# Patient Record
Sex: MALE | Race: WHITE | NOT HISPANIC OR LATINO | URBAN - METROPOLITAN AREA
[De-identification: names, ages, dates, MRNs, and addresses within clinical notes are randomized per-mention and may not be internally consistent; named-entity substitution may affect disease eponyms.]

---

## 2017-02-14 ENCOUNTER — IMPORTED ENCOUNTER (OUTPATIENT)
Dept: URBAN - METROPOLITAN AREA CLINIC 50 | Facility: CLINIC | Age: 82
End: 2017-02-14

## 2017-03-02 NOTE — PROCEDURE NOTE: CLINICAL
Prior to laser, risks/benefits/alternatives to laser discussed including loss of vision, decreased peripheral and night vision, need for more laser and/or surgery and patient wished to proceed. Duration: 0. 1(MSEC) Pulse power: 100/480 mW. Number of pulses: 70. Patient tolerated procedure well. There were no complications. Post-op instructions given.

## 2017-03-07 ENCOUNTER — IMPORTED ENCOUNTER (OUTPATIENT)
Dept: URBAN - METROPOLITAN AREA CLINIC 50 | Facility: CLINIC | Age: 82
End: 2017-03-07

## 2018-07-17 ENCOUNTER — IMPORTED ENCOUNTER (OUTPATIENT)
Dept: URBAN - METROPOLITAN AREA CLINIC 50 | Facility: CLINIC | Age: 83
End: 2018-07-17

## 2019-07-09 ENCOUNTER — IMPORTED ENCOUNTER (OUTPATIENT)
Dept: URBAN - METROPOLITAN AREA CLINIC 50 | Facility: CLINIC | Age: 84
End: 2019-07-09

## 2020-09-29 ENCOUNTER — IMPORTED ENCOUNTER (OUTPATIENT)
Dept: URBAN - METROPOLITAN AREA CLINIC 50 | Facility: CLINIC | Age: 85
End: 2020-09-29

## 2021-04-17 ASSESSMENT — TONOMETRY
OD_IOP_MMHG: 12
OD_IOP_MMHG: 14
OD_IOP_MMHG: 14
OD_IOP_MMHG: 15
OS_IOP_MMHG: 15
OS_IOP_MMHG: 15
OS_IOP_MMHG: 12
OD_IOP_MMHG: 16
OS_IOP_MMHG: 15

## 2021-04-17 ASSESSMENT — VISUAL ACUITY
OS_CC: J1+
OS_SC: 20/30
OD_SC: 20/60
OD_SC: 20/30
OD_CC: 20/25
OD_PH: 20/30
OS_BAT: 20/30
OS_OTHER: 20/30. 20/40.
OS_CC: 20/30
OS_SC: 20/50
OS_SC: 20/30+
OS_PH: 20/25-
OD_PH: 20/25
OS_BAT: 20/80
OD_PH: 20/25-
OD_OTHER: 20/80.
OD_BAT: 20/80
OS_CC: J1+
OS_SC: 20/50
OD_CC: J1+
OS_OTHER: 20/80.
OD_SC: 20/50
OD_SC: 20/30+
OD_CC: J1+
OS_PH: 20/30

## 2021-05-27 NOTE — PATIENT DISCUSSION
5/27/2021:  pt to try OD lens in BOTH eyes and see if it helps DVA and NVA.  IF so new Rx will be same in both eyes.

## 2021-10-07 ENCOUNTER — PREPPED CHART (OUTPATIENT)
Dept: URBAN - METROPOLITAN AREA CLINIC 50 | Facility: CLINIC | Age: 86
End: 2021-10-07

## 2022-07-09 ENCOUNTER — TELEPHONE ENCOUNTER (OUTPATIENT)
Dept: URBAN - METROPOLITAN AREA CLINIC 121 | Facility: CLINIC | Age: 87
End: 2022-07-09

## 2022-07-10 ENCOUNTER — TELEPHONE ENCOUNTER (OUTPATIENT)
Dept: URBAN - METROPOLITAN AREA CLINIC 121 | Facility: CLINIC | Age: 87
End: 2022-07-10

## 2022-07-10 RX ORDER — OMEPRAZOLE 40 MG/1
CAPSULE, DELAYED RELEASE ORAL ONCE A DAY
Refills: 0 | Status: ACTIVE | COMMUNITY
Start: 2013-11-05

## 2022-07-10 RX ORDER — FAMOTIDINE 10 MG
TABLET ORAL ONCE A DAY
Refills: 0 | Status: ACTIVE | COMMUNITY
Start: 2013-11-05

## 2022-07-10 RX ORDER — EZETIMIBE AND SIMVASTATIN 10; 40 MG/1; MG/1
TABLET ORAL ONCE A DAY
Refills: 0 | Status: ACTIVE | COMMUNITY
Start: 2013-11-05

## 2022-07-10 RX ORDER — LEVOTHYROXINE SODIUM ANHYDROUS 100 UG/5ML
INJECTION, POWDER, LYOPHILIZED, FOR SOLUTION INTRAVENOUS ONCE A DAY
Refills: 0 | Status: ACTIVE | COMMUNITY
Start: 2013-11-05

## 2022-07-10 RX ORDER — METOPROLOL SUCCINATE 25 MG/1
TABLET, EXTENDED RELEASE ORAL
Refills: 0 | Status: ACTIVE | COMMUNITY
Start: 2009-06-12

## 2022-07-10 RX ORDER — ASPIRIN 81 MG/1
TABLET, COATED ORAL ONCE A DAY
Refills: 0 | Status: ACTIVE | COMMUNITY
Start: 2013-11-05

## 2022-07-10 RX ORDER — ASPIRIN 81 MG/1
TABLET, COATED ORAL
Refills: 0 | Status: ACTIVE | COMMUNITY
Start: 2009-06-12

## 2022-07-10 RX ORDER — METOPROLOL SUCCINATE 25 MG/1
TABLET, EXTENDED RELEASE ORAL ONCE A DAY
Refills: 0 | Status: ACTIVE | COMMUNITY
Start: 2013-11-05